# Patient Record
Sex: FEMALE | Race: WHITE | NOT HISPANIC OR LATINO
[De-identification: names, ages, dates, MRNs, and addresses within clinical notes are randomized per-mention and may not be internally consistent; named-entity substitution may affect disease eponyms.]

---

## 2018-01-04 PROBLEM — Z00.00 ENCOUNTER FOR PREVENTIVE HEALTH EXAMINATION: Status: ACTIVE | Noted: 2018-01-04

## 2018-01-10 ENCOUNTER — RECORD ABSTRACTING (OUTPATIENT)
Age: 38
End: 2018-01-10

## 2018-01-10 DIAGNOSIS — Z87.42 PERSONAL HISTORY OF OTHER DISEASES OF THE FEMALE GENITAL TRACT: ICD-10-CM

## 2018-01-10 DIAGNOSIS — Z86.2 PERSONAL HISTORY OF DISEASES OF THE BLOOD AND BLOOD-FORMING ORGANS AND CERTAIN DISORDERS INVOLVING THE IMMUNE MECHANISM: ICD-10-CM

## 2018-01-10 DIAGNOSIS — Z80.8 FAMILY HISTORY OF MALIGNANT NEOPLASM OF OTHER ORGANS OR SYSTEMS: ICD-10-CM

## 2018-01-10 DIAGNOSIS — Z83.3 FAMILY HISTORY OF DIABETES MELLITUS: ICD-10-CM

## 2018-01-10 DIAGNOSIS — O03.9 COMPLETE OR UNSPECIFIED SPONTANEOUS ABORTION W/OUT COMPLICATION: ICD-10-CM

## 2018-02-07 ENCOUNTER — APPOINTMENT (OUTPATIENT)
Dept: OBGYN | Facility: CLINIC | Age: 38
End: 2018-02-07

## 2018-03-06 ENCOUNTER — LABORATORY RESULT (OUTPATIENT)
Age: 38
End: 2018-03-06

## 2018-03-06 ENCOUNTER — APPOINTMENT (OUTPATIENT)
Dept: OBGYN | Facility: CLINIC | Age: 38
End: 2018-03-06
Payer: COMMERCIAL

## 2018-03-06 ENCOUNTER — OUTPATIENT (OUTPATIENT)
Dept: OUTPATIENT SERVICES | Facility: HOSPITAL | Age: 38
LOS: 1 days | Discharge: HOME | End: 2018-03-06

## 2018-03-06 VITALS — HEIGHT: 62 IN | WEIGHT: 149 LBS | BODY MASS INDEX: 27.42 KG/M2

## 2018-03-06 LAB
BILIRUB UR QL STRIP: NORMAL
CLARITY UR: CLEAR
GLUCOSE UR-MCNC: NORMAL
HCG UR QL: NORMAL EU/DL
HGB UR QL STRIP.AUTO: NORMAL
KETONES UR-MCNC: NORMAL
LEUKOCYTE ESTERASE UR QL STRIP: NORMAL
NITRITE UR QL STRIP: NORMAL
PH UR STRIP: 8
PROT UR STRIP-MCNC: NORMAL
SP GR UR STRIP: 1

## 2018-03-06 PROCEDURE — 99213 OFFICE O/P EST LOW 20 MIN: CPT | Mod: 25

## 2018-03-06 PROCEDURE — 81003 URINALYSIS AUTO W/O SCOPE: CPT | Mod: QW

## 2018-03-16 DIAGNOSIS — R89.7 ABNORMAL HISTOLOGICAL FINDINGS IN SPECIMENS FROM OTHER ORGANS, SYSTEMS AND TISSUES: ICD-10-CM

## 2019-04-02 ENCOUNTER — APPOINTMENT (OUTPATIENT)
Dept: OBGYN | Facility: CLINIC | Age: 39
End: 2019-04-02

## 2020-07-13 ENCOUNTER — LABORATORY RESULT (OUTPATIENT)
Age: 40
End: 2020-07-13

## 2020-07-14 ENCOUNTER — LABORATORY RESULT (OUTPATIENT)
Age: 40
End: 2020-07-14

## 2020-07-14 ENCOUNTER — APPOINTMENT (OUTPATIENT)
Dept: OBGYN | Facility: CLINIC | Age: 40
End: 2020-07-14
Payer: COMMERCIAL

## 2020-07-14 VITALS — BODY MASS INDEX: 27.42 KG/M2 | TEMPERATURE: 98.1 F | HEIGHT: 62 IN | WEIGHT: 149 LBS

## 2020-07-14 DIAGNOSIS — Z86.2 PERSONAL HISTORY OF DISEASES OF THE BLOOD AND BLOOD-FORMING ORGANS AND CERTAIN DISORDERS INVOLVING THE IMMUNE MECHANISM: ICD-10-CM

## 2020-07-14 PROCEDURE — 81025 URINE PREGNANCY TEST: CPT

## 2020-07-14 PROCEDURE — 81003 URINALYSIS AUTO W/O SCOPE: CPT | Mod: QW

## 2020-07-14 PROCEDURE — 76830 TRANSVAGINAL US NON-OB: CPT

## 2020-07-14 PROCEDURE — 99395 PREV VISIT EST AGE 18-39: CPT | Mod: 25

## 2020-07-23 ENCOUNTER — LABORATORY RESULT (OUTPATIENT)
Age: 40
End: 2020-07-23

## 2020-07-23 ENCOUNTER — APPOINTMENT (OUTPATIENT)
Dept: OBGYN | Facility: CLINIC | Age: 40
End: 2020-07-23
Payer: COMMERCIAL

## 2020-07-23 VITALS — BODY MASS INDEX: 27.8 KG/M2 | TEMPERATURE: 97.7 F | WEIGHT: 152 LBS

## 2020-07-23 LAB
BILIRUB UR QL STRIP: NORMAL
BILIRUB UR QL STRIP: NORMAL
CLARITY UR: CLEAR
GLUCOSE UR-MCNC: NORMAL
GLUCOSE UR-MCNC: NORMAL
HCG UR QL: NORMAL EU/DL
HCG UR QL: NORMAL EU/DL
HGB UR QL STRIP.AUTO: NORMAL
HGB UR QL STRIP.AUTO: NORMAL
KETONES UR-MCNC: NORMAL
KETONES UR-MCNC: NORMAL
LEUKOCYTE ESTERASE UR QL STRIP: NORMAL
LEUKOCYTE ESTERASE UR QL STRIP: NORMAL
NITRITE UR QL STRIP: NORMAL
NITRITE UR QL STRIP: NORMAL
PH UR STRIP: 7
PH UR STRIP: 7
PROT UR STRIP-MCNC: NORMAL
PROT UR STRIP-MCNC: NORMAL
SP GR UR STRIP: 1.01
SP GR UR STRIP: 1.01

## 2020-07-23 PROCEDURE — 99213 OFFICE O/P EST LOW 20 MIN: CPT | Mod: 25

## 2020-07-23 PROCEDURE — 76830 TRANSVAGINAL US NON-OB: CPT

## 2020-08-11 ENCOUNTER — APPOINTMENT (OUTPATIENT)
Dept: OBGYN | Facility: CLINIC | Age: 40
End: 2020-08-11
Payer: COMMERCIAL

## 2020-08-11 ENCOUNTER — NON-APPOINTMENT (OUTPATIENT)
Age: 40
End: 2020-08-11

## 2020-08-11 VITALS — TEMPERATURE: 98.8 F | WEIGHT: 156 LBS | HEIGHT: 62 IN | BODY MASS INDEX: 28.71 KG/M2

## 2020-08-11 PROCEDURE — 0502F SUBSEQUENT PRENATAL CARE: CPT

## 2020-09-01 ENCOUNTER — NON-APPOINTMENT (OUTPATIENT)
Age: 40
End: 2020-09-01

## 2020-09-01 ENCOUNTER — APPOINTMENT (OUTPATIENT)
Dept: OBGYN | Facility: CLINIC | Age: 40
End: 2020-09-01
Payer: COMMERCIAL

## 2020-09-01 VITALS — WEIGHT: 161 LBS | HEIGHT: 62 IN | BODY MASS INDEX: 29.63 KG/M2 | TEMPERATURE: 98.2 F

## 2020-09-01 LAB
BILIRUB UR QL STRIP: NORMAL
BILIRUB UR QL STRIP: NORMAL
CLARITY UR: CLEAR
CLARITY UR: CLEAR
GLUCOSE UR-MCNC: NORMAL
GLUCOSE UR-MCNC: NORMAL
HCG UR QL: NORMAL EU/DL
HCG UR QL: NORMAL EU/DL
HGB UR QL STRIP.AUTO: NORMAL
HGB UR QL STRIP.AUTO: NORMAL
KETONES UR-MCNC: NORMAL
KETONES UR-MCNC: NORMAL
LEUKOCYTE ESTERASE UR QL STRIP: NORMAL
LEUKOCYTE ESTERASE UR QL STRIP: NORMAL
NITRITE UR QL STRIP: NORMAL
NITRITE UR QL STRIP: NORMAL
PH UR STRIP: 5
PH UR STRIP: 7
PROT UR STRIP-MCNC: NORMAL
PROT UR STRIP-MCNC: NORMAL
SP GR UR STRIP: 1.01
SP GR UR STRIP: 1.01

## 2020-09-01 PROCEDURE — 0502F SUBSEQUENT PRENATAL CARE: CPT

## 2020-09-30 ENCOUNTER — NON-APPOINTMENT (OUTPATIENT)
Age: 40
End: 2020-09-30

## 2020-09-30 ENCOUNTER — APPOINTMENT (OUTPATIENT)
Dept: OBGYN | Facility: CLINIC | Age: 40
End: 2020-09-30
Payer: COMMERCIAL

## 2020-09-30 VITALS
WEIGHT: 165 LBS | BODY MASS INDEX: 30.18 KG/M2 | SYSTOLIC BLOOD PRESSURE: 110 MMHG | TEMPERATURE: 97.1 F | DIASTOLIC BLOOD PRESSURE: 70 MMHG

## 2020-09-30 LAB
BILIRUB UR QL STRIP: NORMAL
GLUCOSE UR-MCNC: NORMAL
HCG UR QL: 0.2 EU/DL
HGB UR QL STRIP.AUTO: NORMAL
KETONES UR-MCNC: NORMAL
LEUKOCYTE ESTERASE UR QL STRIP: NORMAL
NITRITE UR QL STRIP: NORMAL
PH UR STRIP: 5.5
PROT UR STRIP-MCNC: NORMAL
SP GR UR STRIP: 1.01

## 2020-09-30 PROCEDURE — 90471 IMMUNIZATION ADMIN: CPT

## 2020-09-30 PROCEDURE — 0502F SUBSEQUENT PRENATAL CARE: CPT

## 2020-09-30 PROCEDURE — 90682 RIV4 VACC RECOMBINANT DNA IM: CPT

## 2020-10-08 ENCOUNTER — NON-APPOINTMENT (OUTPATIENT)
Age: 40
End: 2020-10-08

## 2020-10-22 ENCOUNTER — APPOINTMENT (OUTPATIENT)
Dept: OBGYN | Facility: CLINIC | Age: 40
End: 2020-10-22
Payer: COMMERCIAL

## 2020-10-22 ENCOUNTER — NON-APPOINTMENT (OUTPATIENT)
Age: 40
End: 2020-10-22

## 2020-10-22 VITALS — BODY MASS INDEX: 30.91 KG/M2 | WEIGHT: 168 LBS | TEMPERATURE: 97.9 F | HEIGHT: 62 IN

## 2020-10-22 LAB
BILIRUB UR QL STRIP: NORMAL
CLARITY UR: CLEAR
GLUCOSE UR-MCNC: NORMAL
HCG UR QL: 0.2 EU/DL
HGB UR QL STRIP.AUTO: NORMAL
KETONES UR-MCNC: NORMAL
LEUKOCYTE ESTERASE UR QL STRIP: NORMAL
NITRITE UR QL STRIP: NORMAL
PH UR STRIP: 5.5
PROT UR STRIP-MCNC: NORMAL
SP GR UR STRIP: 1.03

## 2020-10-22 PROCEDURE — 0502F SUBSEQUENT PRENATAL CARE: CPT

## 2020-10-22 PROCEDURE — 99072 ADDL SUPL MATRL&STAF TM PHE: CPT

## 2020-10-22 PROCEDURE — 76805 OB US >/= 14 WKS SNGL FETUS: CPT

## 2020-11-09 ENCOUNTER — NON-APPOINTMENT (OUTPATIENT)
Age: 40
End: 2020-11-09

## 2020-11-09 ENCOUNTER — APPOINTMENT (OUTPATIENT)
Dept: OBGYN | Facility: CLINIC | Age: 40
End: 2020-11-09
Payer: COMMERCIAL

## 2020-11-09 VITALS
WEIGHT: 171 LBS | DIASTOLIC BLOOD PRESSURE: 70 MMHG | BODY MASS INDEX: 31.28 KG/M2 | SYSTOLIC BLOOD PRESSURE: 110 MMHG | TEMPERATURE: 97.3 F

## 2020-11-09 LAB
BILIRUB UR QL STRIP: NORMAL
GLUCOSE UR-MCNC: NORMAL
HCG UR QL: 0.2 EU/DL
HGB UR QL STRIP.AUTO: NORMAL
KETONES UR-MCNC: NORMAL
LEUKOCYTE ESTERASE UR QL STRIP: NORMAL
NITRITE UR QL STRIP: NORMAL
PH UR STRIP: 6
PROT UR STRIP-MCNC: NORMAL
SP GR UR STRIP: 1.01

## 2020-11-09 PROCEDURE — 0502F SUBSEQUENT PRENATAL CARE: CPT

## 2020-12-03 ENCOUNTER — APPOINTMENT (OUTPATIENT)
Dept: OBGYN | Facility: CLINIC | Age: 40
End: 2020-12-03
Payer: COMMERCIAL

## 2020-12-03 ENCOUNTER — NON-APPOINTMENT (OUTPATIENT)
Age: 40
End: 2020-12-03

## 2020-12-03 VITALS — TEMPERATURE: 97.9 F | WEIGHT: 171 LBS | BODY MASS INDEX: 31.47 KG/M2 | HEIGHT: 62 IN

## 2020-12-03 LAB
BILIRUB UR QL STRIP: NORMAL
CLARITY UR: CLEAR
GLUCOSE UR-MCNC: NORMAL
HCG UR QL: 0.2 EU/DL
HGB UR QL STRIP.AUTO: NORMAL
KETONES UR-MCNC: NORMAL
LEUKOCYTE ESTERASE UR QL STRIP: NORMAL
NITRITE UR QL STRIP: NORMAL
PH UR STRIP: 6
PROT UR STRIP-MCNC: NORMAL
SP GR UR STRIP: 1

## 2020-12-03 PROCEDURE — 0502F SUBSEQUENT PRENATAL CARE: CPT

## 2020-12-08 ENCOUNTER — APPOINTMENT (OUTPATIENT)
Dept: MATERNAL FETAL MEDICINE | Facility: CLINIC | Age: 40
End: 2020-12-08
Payer: COMMERCIAL

## 2020-12-08 ENCOUNTER — NON-APPOINTMENT (OUTPATIENT)
Age: 40
End: 2020-12-08

## 2020-12-08 PROCEDURE — 99215 OFFICE O/P EST HI 40 MIN: CPT

## 2020-12-08 PROCEDURE — 76819 FETAL BIOPHYS PROFIL W/O NST: CPT

## 2020-12-08 PROCEDURE — 76816 OB US FOLLOW-UP PER FETUS: CPT

## 2020-12-08 PROCEDURE — 76820 UMBILICAL ARTERY ECHO: CPT

## 2020-12-08 PROCEDURE — 99072 ADDL SUPL MATRL&STAF TM PHE: CPT

## 2020-12-21 ENCOUNTER — APPOINTMENT (OUTPATIENT)
Dept: OBGYN | Facility: CLINIC | Age: 40
End: 2020-12-21
Payer: COMMERCIAL

## 2020-12-21 ENCOUNTER — NON-APPOINTMENT (OUTPATIENT)
Age: 40
End: 2020-12-21

## 2020-12-21 VITALS
BODY MASS INDEX: 31.83 KG/M2 | TEMPERATURE: 97.5 F | SYSTOLIC BLOOD PRESSURE: 110 MMHG | DIASTOLIC BLOOD PRESSURE: 70 MMHG | WEIGHT: 174 LBS

## 2020-12-21 LAB
BILIRUB UR QL STRIP: NORMAL
GLUCOSE UR-MCNC: NORMAL
HCG UR QL: 6.5 EU/DL
HGB UR QL STRIP.AUTO: NORMAL
KETONES UR-MCNC: NORMAL
LEUKOCYTE ESTERASE UR QL STRIP: NORMAL
NITRITE UR QL STRIP: NORMAL
PH UR STRIP: 6.5
PROT UR STRIP-MCNC: NORMAL
SP GR UR STRIP: 1.01

## 2020-12-21 PROCEDURE — 0502F SUBSEQUENT PRENATAL CARE: CPT

## 2020-12-26 ENCOUNTER — NON-APPOINTMENT (OUTPATIENT)
Age: 40
End: 2020-12-26

## 2020-12-29 ENCOUNTER — APPOINTMENT (OUTPATIENT)
Dept: MATERNAL FETAL MEDICINE | Facility: CLINIC | Age: 40
End: 2020-12-29
Payer: COMMERCIAL

## 2020-12-29 PROCEDURE — 99213 OFFICE O/P EST LOW 20 MIN: CPT

## 2020-12-29 PROCEDURE — 76816 OB US FOLLOW-UP PER FETUS: CPT

## 2020-12-29 PROCEDURE — 76819 FETAL BIOPHYS PROFIL W/O NST: CPT

## 2020-12-29 PROCEDURE — 99072 ADDL SUPL MATRL&STAF TM PHE: CPT

## 2021-01-05 ENCOUNTER — APPOINTMENT (OUTPATIENT)
Dept: MATERNAL FETAL MEDICINE | Facility: CLINIC | Age: 41
End: 2021-01-05
Payer: COMMERCIAL

## 2021-01-05 PROCEDURE — 99072 ADDL SUPL MATRL&STAF TM PHE: CPT

## 2021-01-05 PROCEDURE — 99213 OFFICE O/P EST LOW 20 MIN: CPT

## 2021-01-05 PROCEDURE — 76819 FETAL BIOPHYS PROFIL W/O NST: CPT

## 2021-01-05 PROCEDURE — 76815 OB US LIMITED FETUS(S): CPT

## 2021-01-06 ENCOUNTER — NON-APPOINTMENT (OUTPATIENT)
Age: 41
End: 2021-01-06

## 2021-01-06 ENCOUNTER — APPOINTMENT (OUTPATIENT)
Dept: OBGYN | Facility: CLINIC | Age: 41
End: 2021-01-06
Payer: COMMERCIAL

## 2021-01-06 VITALS — HEIGHT: 62 IN | TEMPERATURE: 98 F | WEIGHT: 172 LBS | BODY MASS INDEX: 31.65 KG/M2

## 2021-01-06 LAB
BILIRUB UR QL STRIP: NORMAL
CLARITY UR: CLEAR
GLUCOSE UR-MCNC: NORMAL
HCG UR QL: NORMAL EU/DL
HGB UR QL STRIP.AUTO: NORMAL
KETONES UR-MCNC: NORMAL
LEUKOCYTE ESTERASE UR QL STRIP: NORMAL
NITRITE UR QL STRIP: NORMAL
PH UR STRIP: 5.5
PROT UR STRIP-MCNC: NORMAL
SP GR UR STRIP: 1.01

## 2021-01-06 PROCEDURE — 0502F SUBSEQUENT PRENATAL CARE: CPT

## 2021-01-19 ENCOUNTER — APPOINTMENT (OUTPATIENT)
Dept: MATERNAL FETAL MEDICINE | Facility: CLINIC | Age: 41
End: 2021-01-19
Payer: COMMERCIAL

## 2021-01-19 PROCEDURE — 99072 ADDL SUPL MATRL&STAF TM PHE: CPT

## 2021-01-19 PROCEDURE — 76819 FETAL BIOPHYS PROFIL W/O NST: CPT

## 2021-01-19 PROCEDURE — 76816 OB US FOLLOW-UP PER FETUS: CPT

## 2021-01-20 ENCOUNTER — NON-APPOINTMENT (OUTPATIENT)
Age: 41
End: 2021-01-20

## 2021-01-20 ENCOUNTER — APPOINTMENT (OUTPATIENT)
Dept: OBGYN | Facility: CLINIC | Age: 41
End: 2021-01-20
Payer: COMMERCIAL

## 2021-01-20 VITALS
TEMPERATURE: 97.9 F | DIASTOLIC BLOOD PRESSURE: 70 MMHG | BODY MASS INDEX: 31.09 KG/M2 | SYSTOLIC BLOOD PRESSURE: 110 MMHG | WEIGHT: 170 LBS

## 2021-01-20 LAB
BILIRUB UR QL STRIP: NORMAL
GLUCOSE UR-MCNC: NORMAL
HCG UR QL: 0.2 EU/DL
HGB UR QL STRIP.AUTO: NORMAL
KETONES UR-MCNC: NORMAL
LEUKOCYTE ESTERASE UR QL STRIP: NORMAL
NITRITE UR QL STRIP: NORMAL
PH UR STRIP: 7
PROT UR STRIP-MCNC: NORMAL
SP GR UR STRIP: 1.01

## 2021-01-20 PROCEDURE — 0502F SUBSEQUENT PRENATAL CARE: CPT

## 2021-01-26 ENCOUNTER — NON-APPOINTMENT (OUTPATIENT)
Age: 41
End: 2021-01-26

## 2021-02-02 ENCOUNTER — APPOINTMENT (OUTPATIENT)
Dept: MATERNAL FETAL MEDICINE | Facility: CLINIC | Age: 41
End: 2021-02-02

## 2021-02-03 ENCOUNTER — NON-APPOINTMENT (OUTPATIENT)
Age: 41
End: 2021-02-03

## 2021-02-03 ENCOUNTER — APPOINTMENT (OUTPATIENT)
Dept: OBGYN | Facility: CLINIC | Age: 41
End: 2021-02-03
Payer: COMMERCIAL

## 2021-02-03 ENCOUNTER — APPOINTMENT (OUTPATIENT)
Dept: OBGYN | Facility: CLINIC | Age: 41
End: 2021-02-03

## 2021-02-03 VITALS
SYSTOLIC BLOOD PRESSURE: 110 MMHG | BODY MASS INDEX: 31.09 KG/M2 | WEIGHT: 170 LBS | DIASTOLIC BLOOD PRESSURE: 70 MMHG | TEMPERATURE: 97.8 F

## 2021-02-03 PROCEDURE — 0502F SUBSEQUENT PRENATAL CARE: CPT

## 2021-02-04 LAB
BILIRUB UR QL STRIP: NORMAL
GLUCOSE UR-MCNC: NORMAL
HCG UR QL: 0.2 EU/DL
HGB UR QL STRIP.AUTO: NORMAL
KETONES UR-MCNC: NORMAL
LEUKOCYTE ESTERASE UR QL STRIP: NORMAL
NITRITE UR QL STRIP: NORMAL
PH UR STRIP: 6.5
PROT UR STRIP-MCNC: NORMAL
SP GR UR STRIP: 1.01

## 2021-02-18 ENCOUNTER — APPOINTMENT (OUTPATIENT)
Dept: MATERNAL FETAL MEDICINE | Facility: CLINIC | Age: 41
End: 2021-02-18

## 2021-02-18 ENCOUNTER — APPOINTMENT (OUTPATIENT)
Dept: OBGYN | Facility: CLINIC | Age: 41
End: 2021-02-18

## 2021-02-22 ENCOUNTER — NON-APPOINTMENT (OUTPATIENT)
Age: 41
End: 2021-02-22

## 2021-02-22 ENCOUNTER — LABORATORY RESULT (OUTPATIENT)
Age: 41
End: 2021-02-22

## 2021-02-23 ENCOUNTER — APPOINTMENT (OUTPATIENT)
Dept: OBGYN | Facility: CLINIC | Age: 41
End: 2021-02-23
Payer: COMMERCIAL

## 2021-02-23 ENCOUNTER — NON-APPOINTMENT (OUTPATIENT)
Age: 41
End: 2021-02-23

## 2021-02-23 VITALS — TEMPERATURE: 97.8 F | BODY MASS INDEX: 31.28 KG/M2 | WEIGHT: 171 LBS

## 2021-02-23 PROCEDURE — 0502F SUBSEQUENT PRENATAL CARE: CPT

## 2021-02-25 ENCOUNTER — APPOINTMENT (OUTPATIENT)
Dept: MATERNAL FETAL MEDICINE | Facility: CLINIC | Age: 41
End: 2021-02-25
Payer: COMMERCIAL

## 2021-02-25 PROCEDURE — 99213 OFFICE O/P EST LOW 20 MIN: CPT

## 2021-02-25 PROCEDURE — 76816 OB US FOLLOW-UP PER FETUS: CPT

## 2021-02-25 PROCEDURE — 99072 ADDL SUPL MATRL&STAF TM PHE: CPT

## 2021-02-25 PROCEDURE — 76819 FETAL BIOPHYS PROFIL W/O NST: CPT

## 2021-03-01 ENCOUNTER — LABORATORY RESULT (OUTPATIENT)
Age: 41
End: 2021-03-01

## 2021-03-01 ENCOUNTER — OUTPATIENT (OUTPATIENT)
Dept: OUTPATIENT SERVICES | Facility: HOSPITAL | Age: 41
LOS: 1 days | Discharge: HOME | End: 2021-03-01

## 2021-03-01 DIAGNOSIS — Z11.59 ENCOUNTER FOR SCREENING FOR OTHER VIRAL DISEASES: ICD-10-CM

## 2021-03-03 ENCOUNTER — INPATIENT (INPATIENT)
Facility: HOSPITAL | Age: 41
LOS: 1 days | Discharge: HOME | End: 2021-03-05
Attending: OBSTETRICS & GYNECOLOGY | Admitting: OBSTETRICS & GYNECOLOGY
Payer: COMMERCIAL

## 2021-03-03 ENCOUNTER — RESULT REVIEW (OUTPATIENT)
Age: 41
End: 2021-03-03

## 2021-03-03 ENCOUNTER — APPOINTMENT (OUTPATIENT)
Dept: OBGYN | Facility: CLINIC | Age: 41
End: 2021-03-03
Payer: COMMERCIAL

## 2021-03-03 VITALS — HEART RATE: 93 BPM | SYSTOLIC BLOOD PRESSURE: 116 MMHG | DIASTOLIC BLOOD PRESSURE: 71 MMHG

## 2021-03-03 DIAGNOSIS — Z3A.38 38 WEEKS GESTATION OF PREGNANCY: ICD-10-CM

## 2021-03-03 DIAGNOSIS — O34.211 MATERNAL CARE FOR LOW TRANSVERSE SCAR FROM PREVIOUS CESAREAN DELIVERY: ICD-10-CM

## 2021-03-03 DIAGNOSIS — Z98.891 HISTORY OF UTERINE SCAR FROM PREVIOUS SURGERY: Chronic | ICD-10-CM

## 2021-03-03 DIAGNOSIS — O24.419 GESTATIONAL DIABETES MELLITUS IN PREGNANCY, UNSPECIFIED CONTROL: ICD-10-CM

## 2021-03-03 DIAGNOSIS — D50.9 IRON DEFICIENCY ANEMIA, UNSPECIFIED: ICD-10-CM

## 2021-03-03 LAB
AMPHET UR-MCNC: NEGATIVE — SIGNIFICANT CHANGE UP
APPEARANCE UR: CLEAR — SIGNIFICANT CHANGE UP
BARBITURATES UR SCN-MCNC: NEGATIVE — SIGNIFICANT CHANGE UP
BASOPHILS # BLD AUTO: 0.01 K/UL — SIGNIFICANT CHANGE UP (ref 0–0.2)
BASOPHILS NFR BLD AUTO: 0.1 % — SIGNIFICANT CHANGE UP (ref 0–1)
BENZODIAZ UR-MCNC: NEGATIVE — SIGNIFICANT CHANGE UP
BILIRUB UR-MCNC: NEGATIVE — SIGNIFICANT CHANGE UP
BLD GP AB SCN SERPL QL: SIGNIFICANT CHANGE UP
BUPRENORPHINE SCREEN, URINE RESULT: NEGATIVE — SIGNIFICANT CHANGE UP
COCAINE METAB.OTHER UR-MCNC: NEGATIVE — SIGNIFICANT CHANGE UP
COLOR SPEC: SIGNIFICANT CHANGE UP
DIFF PNL FLD: NEGATIVE — SIGNIFICANT CHANGE UP
EOSINOPHIL # BLD AUTO: 0.06 K/UL — SIGNIFICANT CHANGE UP (ref 0–0.7)
EOSINOPHIL NFR BLD AUTO: 0.7 % — SIGNIFICANT CHANGE UP (ref 0–8)
FENTANYL UR QL: NEGATIVE — SIGNIFICANT CHANGE UP
GLUCOSE BLDC GLUCOMTR-MCNC: 76 MG/DL — SIGNIFICANT CHANGE UP (ref 70–99)
GLUCOSE UR QL: NEGATIVE — SIGNIFICANT CHANGE UP
HCT VFR BLD CALC: 31 % — LOW (ref 37–47)
HGB BLD-MCNC: 9.8 G/DL — LOW (ref 12–16)
IMM GRANULOCYTES NFR BLD AUTO: 0.4 % — HIGH (ref 0.1–0.3)
KETONES UR-MCNC: NEGATIVE — SIGNIFICANT CHANGE UP
L&D DRUG SCREEN, URINE: SIGNIFICANT CHANGE UP
LEUKOCYTE ESTERASE UR-ACNC: NEGATIVE — SIGNIFICANT CHANGE UP
LYMPHOCYTES # BLD AUTO: 1.59 K/UL — SIGNIFICANT CHANGE UP (ref 1.2–3.4)
LYMPHOCYTES # BLD AUTO: 17.8 % — LOW (ref 20.5–51.1)
MCHC RBC-ENTMCNC: 19.8 PG — LOW (ref 27–31)
MCHC RBC-ENTMCNC: 31.6 G/DL — LOW (ref 32–37)
MCV RBC AUTO: 62.8 FL — LOW (ref 81–99)
METHADONE UR-MCNC: NEGATIVE — SIGNIFICANT CHANGE UP
MONOCYTES # BLD AUTO: 0.4 K/UL — SIGNIFICANT CHANGE UP (ref 0.1–0.6)
MONOCYTES NFR BLD AUTO: 4.5 % — SIGNIFICANT CHANGE UP (ref 1.7–9.3)
NEUTROPHILS # BLD AUTO: 6.82 K/UL — HIGH (ref 1.4–6.5)
NEUTROPHILS NFR BLD AUTO: 76.5 % — HIGH (ref 42.2–75.2)
NITRITE UR-MCNC: NEGATIVE — SIGNIFICANT CHANGE UP
NRBC # BLD: 0 /100 WBCS — SIGNIFICANT CHANGE UP (ref 0–0)
OPIATES UR-MCNC: NEGATIVE — SIGNIFICANT CHANGE UP
OXYCODONE UR-MCNC: NEGATIVE — SIGNIFICANT CHANGE UP
PCP UR-MCNC: NEGATIVE — SIGNIFICANT CHANGE UP
PH UR: 6.5 — SIGNIFICANT CHANGE UP (ref 5–8)
PLATELET # BLD AUTO: 221 K/UL — SIGNIFICANT CHANGE UP (ref 130–400)
PRENATAL SYPHILIS TEST: SIGNIFICANT CHANGE UP
PROPOXYPHENE QUALITATIVE URINE RESULT: NEGATIVE — SIGNIFICANT CHANGE UP
PROT UR-MCNC: NEGATIVE — SIGNIFICANT CHANGE UP
RBC # BLD: 4.94 M/UL — SIGNIFICANT CHANGE UP (ref 4.2–5.4)
RBC # FLD: 18.1 % — HIGH (ref 11.5–14.5)
SP GR SPEC: 1.01 — SIGNIFICANT CHANGE UP (ref 1.01–1.03)
UROBILINOGEN FLD QL: SIGNIFICANT CHANGE UP
WBC # BLD: 8.92 K/UL — SIGNIFICANT CHANGE UP (ref 4.8–10.8)
WBC # FLD AUTO: 8.92 K/UL — SIGNIFICANT CHANGE UP (ref 4.8–10.8)

## 2021-03-03 PROCEDURE — 99072 ADDL SUPL MATRL&STAF TM PHE: CPT

## 2021-03-03 PROCEDURE — 88304 TISSUE EXAM BY PATHOLOGIST: CPT | Mod: 26

## 2021-03-03 PROCEDURE — 59510 CESAREAN DELIVERY: CPT

## 2021-03-03 PROCEDURE — 58611 LIGATE OVIDUCT(S) ADD-ON: CPT

## 2021-03-03 PROCEDURE — 90471 IMMUNIZATION ADMIN: CPT

## 2021-03-03 PROCEDURE — 88302 TISSUE EXAM BY PATHOLOGIST: CPT | Mod: 26

## 2021-03-03 PROCEDURE — 90715 TDAP VACCINE 7 YRS/> IM: CPT

## 2021-03-03 RX ORDER — OXYTOCIN 10 UNIT/ML
41.67 VIAL (ML) INJECTION
Qty: 20 | Refills: 0 | Status: DISCONTINUED | OUTPATIENT
Start: 2021-03-03 | End: 2021-03-05

## 2021-03-03 RX ORDER — METOCLOPRAMIDE HCL 10 MG
10 TABLET ORAL ONCE
Refills: 0 | Status: DISCONTINUED | OUTPATIENT
Start: 2021-03-03 | End: 2021-03-03

## 2021-03-03 RX ORDER — ENOXAPARIN SODIUM 100 MG/ML
40 INJECTION SUBCUTANEOUS DAILY
Refills: 0 | Status: DISCONTINUED | OUTPATIENT
Start: 2021-03-04 | End: 2021-03-05

## 2021-03-03 RX ORDER — SIMETHICONE 80 MG/1
80 TABLET, CHEWABLE ORAL EVERY 4 HOURS
Refills: 0 | Status: DISCONTINUED | OUTPATIENT
Start: 2021-03-03 | End: 2021-03-05

## 2021-03-03 RX ORDER — KETOROLAC TROMETHAMINE 30 MG/ML
30 SYRINGE (ML) INJECTION EVERY 6 HOURS
Refills: 0 | Status: DISCONTINUED | OUTPATIENT
Start: 2021-03-03 | End: 2021-03-03

## 2021-03-03 RX ORDER — SODIUM CHLORIDE 9 MG/ML
1000 INJECTION, SOLUTION INTRAVENOUS ONCE
Refills: 0 | Status: DISCONTINUED | OUTPATIENT
Start: 2021-03-03 | End: 2021-03-03

## 2021-03-03 RX ORDER — OXYTOCIN 10 UNIT/ML
333.33 VIAL (ML) INJECTION
Qty: 20 | Refills: 0 | Status: DISCONTINUED | OUTPATIENT
Start: 2021-03-03 | End: 2021-03-05

## 2021-03-03 RX ORDER — DIPHENHYDRAMINE HCL 50 MG
25 CAPSULE ORAL EVERY 6 HOURS
Refills: 0 | Status: DISCONTINUED | OUTPATIENT
Start: 2021-03-03 | End: 2021-03-05

## 2021-03-03 RX ORDER — OXYCODONE HYDROCHLORIDE 5 MG/1
5 TABLET ORAL
Refills: 0 | Status: DISCONTINUED | OUTPATIENT
Start: 2021-03-03 | End: 2021-03-05

## 2021-03-03 RX ORDER — IBUPROFEN 200 MG
600 TABLET ORAL EVERY 6 HOURS
Refills: 0 | Status: DISCONTINUED | OUTPATIENT
Start: 2021-03-03 | End: 2021-03-05

## 2021-03-03 RX ORDER — MAGNESIUM HYDROXIDE 400 MG/1
30 TABLET, CHEWABLE ORAL
Refills: 0 | Status: DISCONTINUED | OUTPATIENT
Start: 2021-03-03 | End: 2021-03-05

## 2021-03-03 RX ORDER — ACETAMINOPHEN 500 MG
975 TABLET ORAL
Refills: 0 | Status: DISCONTINUED | OUTPATIENT
Start: 2021-03-03 | End: 2021-03-05

## 2021-03-03 RX ORDER — SODIUM CHLORIDE 9 MG/ML
1000 INJECTION, SOLUTION INTRAVENOUS
Refills: 0 | Status: DISCONTINUED | OUTPATIENT
Start: 2021-03-03 | End: 2021-03-03

## 2021-03-03 RX ORDER — LANOLIN
1 OINTMENT (GRAM) TOPICAL EVERY 6 HOURS
Refills: 0 | Status: DISCONTINUED | OUTPATIENT
Start: 2021-03-03 | End: 2021-03-05

## 2021-03-03 RX ORDER — FAMOTIDINE 10 MG/ML
20 INJECTION INTRAVENOUS ONCE
Refills: 0 | Status: COMPLETED | OUTPATIENT
Start: 2021-03-03 | End: 2021-03-03

## 2021-03-03 RX ORDER — SENNA PLUS 8.6 MG/1
2 TABLET ORAL AT BEDTIME
Refills: 0 | Status: DISCONTINUED | OUTPATIENT
Start: 2021-03-03 | End: 2021-03-05

## 2021-03-03 RX ORDER — IBUPROFEN 200 MG
600 TABLET ORAL EVERY 6 HOURS
Refills: 0 | Status: COMPLETED | OUTPATIENT
Start: 2021-03-03 | End: 2022-01-30

## 2021-03-03 RX ORDER — OXYCODONE HYDROCHLORIDE 5 MG/1
5 TABLET ORAL ONCE
Refills: 0 | Status: DISCONTINUED | OUTPATIENT
Start: 2021-03-03 | End: 2021-03-05

## 2021-03-03 RX ORDER — CEFAZOLIN SODIUM 1 G
2000 VIAL (EA) INJECTION ONCE
Refills: 0 | Status: COMPLETED | OUTPATIENT
Start: 2021-03-03 | End: 2021-03-03

## 2021-03-03 RX ORDER — SODIUM CHLORIDE 9 MG/ML
1000 INJECTION, SOLUTION INTRAVENOUS
Refills: 0 | Status: DISCONTINUED | OUTPATIENT
Start: 2021-03-03 | End: 2021-03-05

## 2021-03-03 RX ORDER — CITRIC ACID/SODIUM CITRATE 300-500 MG
30 SOLUTION, ORAL ORAL ONCE
Refills: 0 | Status: COMPLETED | OUTPATIENT
Start: 2021-03-03 | End: 2021-03-03

## 2021-03-03 RX ADMIN — Medication 600 MILLIGRAM(S): at 17:21

## 2021-03-03 RX ADMIN — Medication 975 MILLIGRAM(S): at 15:03

## 2021-03-03 RX ADMIN — SENNA PLUS 2 TABLET(S): 8.6 TABLET ORAL at 21:43

## 2021-03-03 RX ADMIN — Medication 100 MILLIGRAM(S): at 09:59

## 2021-03-03 RX ADMIN — FAMOTIDINE 20 MILLIGRAM(S): 10 INJECTION INTRAVENOUS at 10:01

## 2021-03-03 RX ADMIN — Medication 30 MILLILITER(S): at 10:00

## 2021-03-03 RX ADMIN — Medication 600 MILLIGRAM(S): at 17:51

## 2021-03-03 RX ADMIN — Medication 600 MILLIGRAM(S): at 23:02

## 2021-03-03 RX ADMIN — Medication 975 MILLIGRAM(S): at 15:30

## 2021-03-03 RX ADMIN — Medication 600 MILLIGRAM(S): at 23:03

## 2021-03-03 NOTE — OB PROVIDER H&P - HISTORY OF PRESENT ILLNESS
40y  @ 38.5 weeks by LMP, EDC 3/12/2021, AMA, h/o GDM and anemia, present for scheduled repeat C/S and bilateral tubal ligation. Patient has h/o prior C/S x 1. GDM is controlled with insulin at bedtime. Fasting ranges from . 2 hrs post prandial ranges from . FS this morning was 76. Last dose of insulin was taken last night. S/p iron transfusion x 7, last transfusion was 2021. Denies VB, LOF, and ctx. +FM. No complaints at this time.   40y  @ 38.5 weeks by LMP, EDC 3/12/2021, AMA, h/o GDM and anemia, present for scheduled repeat C/S and bilateral tubal ligation. Patient has h/o prior C/S x 1. GDM is controlled with insulin at bedtime. Fasting ranges from . 2 hrs post prandial ranges from . FS this morning was 76. Last dose of insulin was taken last night. S/p iron infusion x 7, last insfusion was 2021. Denies VB, LOF, and ctx. +FM. No complaints at this time.   40y  @ 38.5 weeks by LMP, EDC 3/12/2021, AMA, h/o GDM and anemia, present for scheduled repeat C/S and bilateral salpingectomy. Patient has h/o prior C/S x 1. GDM is controlled with insulin at bedtime. Fasting ranges from . 2 hrs post prandial ranges from . FS this morning was 76. Last dose of insulin was taken last night. S/p iron infusion x 7, last insfusion was 2021. Denies VB, LOF, and ctx. +FM. No complaints at this time.

## 2021-03-03 NOTE — CHART NOTE - NSCHARTNOTEFT_GEN_A_CORE
PACU ANESTHESIA ADMISSION NOTE      Procedure:  section with bilateral salpingectomy    Repeat  section      Post op diagnosis:  GDM (gestational diabetes mellitus)    Previous  section    Multiparity        ____  Intubated  TV:______       Rate: ______      FiO2: ______    __x__  Patent Airway    ____  Full return of protective reflexes    ____  Full recovery from anesthesia / back to baseline     Vitals:   T:  98         R:  18                BP: 108/65                 Sat: 98                  P: 86      Mental Status:  ___x_ Awake   _____ Alert   _____ Drowsy   _____ Sedated    Nausea/Vomiting:  _x___ NO  ______Yes,   See Post - Op Orders          Pain Scale (0-10):  ___0__    Treatment: ____ None    ____ See Post - Op/PCA Orders    Post - Operative Fluids:   ___x Oral   ____ See Post - Op Orders    Plan: Discharge:   ____Home       ___x__Floor     _____Critical Care    _____  Other:_________________    Comments:

## 2021-03-03 NOTE — OB PROVIDER H&P - ASSESSMENT
40y  @ 38.5 weeks, AMA, GDMA2 and anemia, h/o prior c/s x 2, scheduled rpt c/s x 3 and BTL.    Plan:  Admit to L&D  IVF  NPO diet  Continuous TOCO/EFM  Fingerstick on admission  Crossed for 2 PRBC units  Ancef prior to OR  Abdominal prep  SCDs B/L  Pain Management PRN    Dr. Lopez aware 40y  @ 38.5 weeks, AMA, GDMA2 and anemia, h/o prior c/s x 2, scheduled rpt c/s x 3 and bilateral salpingectomy.    Plan:  Admit to L&D  IVF  NPO diet  Continuous TOCO/EFM  Fingerstick on admission  Crossed for 2 PRBC units  Ancef prior to OR  Abdominal prep  SCDs B/L  Pain Management PRN    Dr. Lopez aware

## 2021-03-03 NOTE — OB PROVIDER H&P - NS_OBGYNHISTORY_OBGYN_ALL_OB_FT
OB Hx: C/S x 2. Largest 7-2               SAB x 1. D&C x 0    G1 10/30/2010 FT C/S for arrest at 8 cm and NRFHR M 7-2 UH No complications +Epi  G2 4/25/2014 FT Rpt c/s F 6-12 [facility] GDMA1    Gyn Hx: h/o abnormal paps  Denies cysts, fibroids, and STIs.

## 2021-03-03 NOTE — OB PROVIDER H&P - NSHPPHYSICALEXAM_GEN_ALL_CORE
T(C): 36.6 (03-03-21 @ 08:50), Max: 36.6 (03-03-21 @ 08:50)  HR: 85 (03-03-21 @ 08:53) (85 - 93)  BP: 111/68 (03-03-21 @ 08:53) (111/68 - 116/71)  RR: 18 (03-03-21 @ 08:50) (18 - 18)    EFM: 130 bpm, moderate variability, +accels  TOCO: irregular    Abd: soft, gravid, nontender, no incisional tenderness

## 2021-03-03 NOTE — OB RN INTRAOPERATIVE NOTE - NS_SPECIMENTYPE_OBGYN_ALL_OB
Placenta/Left Fallopian Tube/Right Fallopian Tube Placenta/Left Fallopian Tube/Right Fallopian Tube/Other

## 2021-03-03 NOTE — BRIEF OPERATIVE NOTE - NSICDXBRIEFPROCEDURE_GEN_ALL_CORE_FT
PROCEDURES:   section with bilateral salpingectomy 03-Mar-2021 12:22:35  Elvira Cox  Repeat  section 03-Mar-2021 12:21:21  Elvira Cox

## 2021-03-03 NOTE — PROGRESS NOTE ADULT - SUBJECTIVE AND OBJECTIVE BOX
SANDEE WU  40y  Female    PGY1 Note:    Pt is POD#0. Pt is recovering well, no acute complaints. Pt denies heavy vaginal bleeding, pain well controlled on PO medications. Patient is tolerating a clear liquid diet without nausea or vomiting. Denies heavy vaginal bleeding, pain well controlled on PO medications. Patient has not yet ambulated or passed gas. Patient is bottle feeding. Mayo in place with adequate clear urine. Denies headache, chest pain, SOB, fever, chills, nausea, vomiting, extremity pain or swelling.         PAST MEDICAL & SURGICAL HISTORY:  Anemia    History of  section  x 2        Physical Exam  Vital Signs Last 24 Hrs  T(C): 35.6 (03 Mar 2021 15:36), Max: 36.6 (03 Mar 2021 08:50)  T(F): 96 (03 Mar 2021 15:36), Max: 97.8 (03 Mar 2021 08:50)  HR: 78 (03 Mar 2021 15:36) (69 - 93)  BP: 91/55 (03 Mar 2021 15:36) (91/55 - 120/65)  RR: 18 (03 Mar 2021 15:36) (18 - 19)  SpO2: 100% (03 Mar 2021 14:15) (99% - 100%)    Gen: AAOx3, NAD  Heart: RRR, no M/R/G  Lungs: CTAB  Fundus: firm, below umbilicus, nontender  Wound: Pfannenstiel incision. initial dressing in place, clean and dry    Abd: Soft, appropriately tender near incision site, mildly distended, BS+  Lochia: moderate  Ext: No calf tenderness, no swelling    Labs:                        9.8    8.92  )-----------( 221      ( 03 Mar 2021 09:16 )             31.0      MEDICATIONS  (STANDING):  acetaminophen   Tablet .. 975 milliGRAM(s) Oral <User Schedule>  ibuprofen  Tablet. 600 milliGRAM(s) Oral every 6 hours  lactated ringers. 1000 milliLiter(s) (125 mL/Hr) IV Continuous <Continuous>  oxytocin Infusion 41.667 milliUNIT(s)/Min (125 mL/Hr) IV Continuous <Continuous>  oxytocin Infusion 333.333 milliUNIT(s)/Min (1000 mL/Hr) IV Continuous <Continuous>  senna 2 Tablet(s) Oral at bedtime    MEDICATIONS  (PRN):  diphenhydrAMINE 25 milliGRAM(s) Oral every 6 hours PRN Pruritus  lanolin Ointment 1 Application(s) Topical every 6 hours PRN Sore Nipples  magnesium hydroxide Suspension 30 milliLiter(s) Oral two times a day PRN Constipation  oxyCODONE    IR 5 milliGRAM(s) Oral every 3 hours PRN Moderate to Severe Pain (4-10)  oxyCODONE    IR 5 milliGRAM(s) Oral once PRN Moderate to Severe Pain (4-10)  simethicone 80 milliGRAM(s) Chew every 4 hours PRN Gas   SANDEE WU  40y  Female    PGY1 Note:    Pt is POD#0. Pt is recovering well, no acute complaints. Pt denies heavy vaginal bleeding, pain well controlled on PO medications. Patient is tolerating a clear liquid diet without nausea or vomiting. Denies heavy vaginal bleeding, pain well controlled on PO medications. Patient has not yet ambulated or passed gas. Patient is bottle feeding. Mayo in place with adequate clear urine. Denies headache, chest pain, SOB, fever, chills, nausea, vomiting, extremity pain or swelling.         PAST MEDICAL & SURGICAL HISTORY:  Anemia    History of  section  x 2        Physical Exam  Vital Signs Last 24 Hrs  T(C): 35.6 (03 Mar 2021 15:36), Max: 36.6 (03 Mar 2021 08:50)  T(F): 96 (03 Mar 2021 15:36), Max: 97.8 (03 Mar 2021 08:50)  HR: 78 (03 Mar 2021 15:36) (69 - 93)  BP: 91/55 (03 Mar 2021 15:36) (91/55 - 120/65)  RR: 18 (03 Mar 2021 15:36) (18 - 19)  SpO2: 100% (03 Mar 2021 14:15) (99% - 100%)    Gen: AAOx3, NAD  Heart: RRR, no M/R/G  Lungs: CTAB  Fundus: firm, below umbilicus, nontender  Wound: Pfannenstiel incision. initial dressing in place, clean and dry    Abd: Soft, appropriately tender near incision site, mildly distended, BS+  Lochia: moderate  Ext: No calf tenderness, no swelling    Labs:                        9.8    8.92  )-----------( 221      ( 03 Mar 2021 09:16 )             31.0     UOmin 37cc/hr: 6615-5256: 250cc     MEDICATIONS  (STANDING):  acetaminophen   Tablet .. 975 milliGRAM(s) Oral <User Schedule>  ibuprofen  Tablet. 600 milliGRAM(s) Oral every 6 hours  lactated ringers. 1000 milliLiter(s) (125 mL/Hr) IV Continuous <Continuous>  oxytocin Infusion 41.667 milliUNIT(s)/Min (125 mL/Hr) IV Continuous <Continuous>  oxytocin Infusion 333.333 milliUNIT(s)/Min (1000 mL/Hr) IV Continuous <Continuous>  senna 2 Tablet(s) Oral at bedtime    MEDICATIONS  (PRN):  diphenhydrAMINE 25 milliGRAM(s) Oral every 6 hours PRN Pruritus  lanolin Ointment 1 Application(s) Topical every 6 hours PRN Sore Nipples  magnesium hydroxide Suspension 30 milliLiter(s) Oral two times a day PRN Constipation  oxyCODONE    IR 5 milliGRAM(s) Oral every 3 hours PRN Moderate to Severe Pain (4-10)  oxyCODONE    IR 5 milliGRAM(s) Oral once PRN Moderate to Severe Pain (4-10)  simethicone 80 milliGRAM(s) Chew every 4 hours PRN Gas

## 2021-03-03 NOTE — OB RN PATIENT PROFILE - HBSAG: DATE, OB PROFILE
Pt was put on Dr Rodgers's schedule instead of being sent to ED. However, Dr Ochoa has agreed to see pt in clinic. Call made to pt, writer apologized for all of the confusion and advised him Dr Ochoa would see him at 0945 after all.   23-Jul-2020

## 2021-03-03 NOTE — BRIEF OPERATIVE NOTE - NSICDXBRIEFPREOP_GEN_ALL_CORE_FT
PRE-OP DIAGNOSIS:  Multiparity 03-Mar-2021 12:21:43  Elvira Cox  Previous  section 03-Mar-2021 12:21:35  Elvira Cox  GDM (gestational diabetes mellitus) 03-Mar-2021 12:22:01  Elvira Cox

## 2021-03-03 NOTE — BRIEF OPERATIVE NOTE - NSICDXBRIEFPOSTOP_GEN_ALL_CORE_FT
POST-OP DIAGNOSIS:  Multiparity 03-Mar-2021 12:22:11  Elvira Cox  GDM (gestational diabetes mellitus) 03-Mar-2021 12:22:20  Elvira Cox  Previous  section 03-Mar-2021 12:22:15  Elvira Cox

## 2021-03-03 NOTE — OB PROVIDER H&P - NSHPLABSRESULTS_GEN_ALL_CORE
GCT: 194    1/19/2021: 32.4 weeks: EFW 1766 gms (32%), cephalic, 3VC, ant placenta, MVP 44 mm, BPP 8/8 1/5/2021: 30.4 weeks: breech, 3VC, ant placenta, MVP 56 mm, BPP 8/8 12/29/2020: 29.4 weeks: EFW 1491 gms (56%), breech, 33VC, Ant placenta, MVP 39 mm, BPP 8/8  10/22/2020: 19.6 weeks: transverse, Ant placenta, no gross anatomy abnormalities  8/24/2020: 11.3 weeks: NT 0.8 mm

## 2021-03-04 LAB
BASOPHILS # BLD AUTO: 0.02 K/UL — SIGNIFICANT CHANGE UP (ref 0–0.2)
BASOPHILS # BLD AUTO: 0.02 K/UL — SIGNIFICANT CHANGE UP (ref 0–0.2)
BASOPHILS NFR BLD AUTO: 0.2 % — SIGNIFICANT CHANGE UP (ref 0–1)
BASOPHILS NFR BLD AUTO: 0.2 % — SIGNIFICANT CHANGE UP (ref 0–1)
EOSINOPHIL # BLD AUTO: 0.04 K/UL — SIGNIFICANT CHANGE UP (ref 0–0.7)
EOSINOPHIL # BLD AUTO: 0.07 K/UL — SIGNIFICANT CHANGE UP (ref 0–0.7)
EOSINOPHIL NFR BLD AUTO: 0.4 % — SIGNIFICANT CHANGE UP (ref 0–8)
EOSINOPHIL NFR BLD AUTO: 0.6 % — SIGNIFICANT CHANGE UP (ref 0–8)
HCT VFR BLD CALC: 22.9 % — LOW (ref 37–47)
HCT VFR BLD CALC: 25.2 % — LOW (ref 37–47)
HGB BLD-MCNC: 7.1 G/DL — LOW (ref 12–16)
HGB BLD-MCNC: 7.7 G/DL — LOW (ref 12–16)
HIV 1+2 AB+HIV1 P24 AG SERPL QL IA: SIGNIFICANT CHANGE UP
IMM GRANULOCYTES NFR BLD AUTO: 0.3 % — SIGNIFICANT CHANGE UP (ref 0.1–0.3)
IMM GRANULOCYTES NFR BLD AUTO: 0.5 % — HIGH (ref 0.1–0.3)
LYMPHOCYTES # BLD AUTO: 1.91 K/UL — SIGNIFICANT CHANGE UP (ref 1.2–3.4)
LYMPHOCYTES # BLD AUTO: 15.7 % — LOW (ref 20.5–51.1)
LYMPHOCYTES # BLD AUTO: 2.28 K/UL — SIGNIFICANT CHANGE UP (ref 1.2–3.4)
LYMPHOCYTES # BLD AUTO: 20.8 % — SIGNIFICANT CHANGE UP (ref 20.5–51.1)
MCHC RBC-ENTMCNC: 19.5 PG — LOW (ref 27–31)
MCHC RBC-ENTMCNC: 19.7 PG — LOW (ref 27–31)
MCHC RBC-ENTMCNC: 30.6 G/DL — LOW (ref 32–37)
MCHC RBC-ENTMCNC: 31 G/DL — LOW (ref 32–37)
MCV RBC AUTO: 63.6 FL — LOW (ref 81–99)
MCV RBC AUTO: 64 FL — LOW (ref 81–99)
MONOCYTES # BLD AUTO: 0.66 K/UL — HIGH (ref 0.1–0.6)
MONOCYTES # BLD AUTO: 0.7 K/UL — HIGH (ref 0.1–0.6)
MONOCYTES NFR BLD AUTO: 5.4 % — SIGNIFICANT CHANGE UP (ref 1.7–9.3)
MONOCYTES NFR BLD AUTO: 6.4 % — SIGNIFICANT CHANGE UP (ref 1.7–9.3)
NEUTROPHILS # BLD AUTO: 7.87 K/UL — HIGH (ref 1.4–6.5)
NEUTROPHILS # BLD AUTO: 9.49 K/UL — HIGH (ref 1.4–6.5)
NEUTROPHILS NFR BLD AUTO: 71.7 % — SIGNIFICANT CHANGE UP (ref 42.2–75.2)
NEUTROPHILS NFR BLD AUTO: 77.8 % — HIGH (ref 42.2–75.2)
NRBC # BLD: 0 /100 WBCS — SIGNIFICANT CHANGE UP (ref 0–0)
NRBC # BLD: 0 /100 WBCS — SIGNIFICANT CHANGE UP (ref 0–0)
PLATELET # BLD AUTO: 179 K/UL — SIGNIFICANT CHANGE UP (ref 130–400)
PLATELET # BLD AUTO: 220 K/UL — SIGNIFICANT CHANGE UP (ref 130–400)
RBC # BLD: 3.6 M/UL — LOW (ref 4.2–5.4)
RBC # BLD: 3.94 M/UL — LOW (ref 4.2–5.4)
RBC # FLD: 17.2 % — HIGH (ref 11.5–14.5)
RBC # FLD: 17.7 % — HIGH (ref 11.5–14.5)
SARS-COV-2 IGG SERPL QL IA: NEGATIVE — SIGNIFICANT CHANGE UP
SARS-COV-2 IGM SERPL IA-ACNC: 0.06 INDEX — SIGNIFICANT CHANGE UP
SURGICAL PATHOLOGY STUDY: SIGNIFICANT CHANGE UP
WBC # BLD: 10.96 K/UL — HIGH (ref 4.8–10.8)
WBC # BLD: 12.19 K/UL — HIGH (ref 4.8–10.8)
WBC # FLD AUTO: 10.96 K/UL — HIGH (ref 4.8–10.8)
WBC # FLD AUTO: 12.19 K/UL — HIGH (ref 4.8–10.8)

## 2021-03-04 RX ADMIN — Medication 600 MILLIGRAM(S): at 18:34

## 2021-03-04 RX ADMIN — Medication 600 MILLIGRAM(S): at 12:28

## 2021-03-04 RX ADMIN — Medication 975 MILLIGRAM(S): at 15:06

## 2021-03-04 RX ADMIN — SENNA PLUS 2 TABLET(S): 8.6 TABLET ORAL at 21:19

## 2021-03-04 RX ADMIN — Medication 975 MILLIGRAM(S): at 09:00

## 2021-03-04 RX ADMIN — Medication 600 MILLIGRAM(S): at 11:34

## 2021-03-04 RX ADMIN — Medication 600 MILLIGRAM(S): at 06:29

## 2021-03-04 RX ADMIN — MAGNESIUM HYDROXIDE 30 MILLILITER(S): 400 TABLET, CHEWABLE ORAL at 18:34

## 2021-03-04 RX ADMIN — Medication 975 MILLIGRAM(S): at 14:26

## 2021-03-04 RX ADMIN — SIMETHICONE 80 MILLIGRAM(S): 80 TABLET, CHEWABLE ORAL at 16:17

## 2021-03-04 RX ADMIN — Medication 975 MILLIGRAM(S): at 08:14

## 2021-03-04 RX ADMIN — Medication 600 MILLIGRAM(S): at 19:56

## 2021-03-04 RX ADMIN — ENOXAPARIN SODIUM 40 MILLIGRAM(S): 100 INJECTION SUBCUTANEOUS at 11:35

## 2021-03-04 NOTE — PROGRESS NOTE ADULT - SUBJECTIVE AND OBJECTIVE BOX
Status post rcs and bilateral salpingectomy day # 1  Afebarle and vs stable  Ambulatory; voiding well since pereyra out; using incentive spirometrer. tolerating full liquid diet ; not passing rectal flatus yet.  PE;abdomen softly distended and moderately tympanic       incision  healing well; no evidence of induration       lochia light       uterine fundus firm, well below umbilicus       no calf tenderness    hg , as per note from this am down to 7.1 from 9gm pre op ( pt had been receiving fe infusions prior to admission)    imp; post op rcs doing well    plan; encourage  ambulation          encourage p o fluids          continue clear liquid diet.          if not passing flatus or if abdomen remains tympanic, fleets enema tonight or first thing in am          possilbe discharge tomorrow if passing flatus and if able to tolerate regular diet

## 2021-03-04 NOTE — PROGRESS NOTE ADULT - SUBJECTIVE AND OBJECTIVE BOX
Post  section POD1     Pt doing well, pain well controlled. No overnight events, no acute complaints. She is OOB to chair, tolerating clear liquid diet. Has not passed flatus. Denies N/V, chest pain, SOB, fevers, chills, extremity pain or swelling    ROS: Denies cardiovascular or respiratory symptoms        Physical Exam  Vital Signs Last 24 Hrs  T(F): 97.2 (04 Mar 2021 03:40), Max: 97.9 (03 Mar 2021 23:27)  HR: 80 (04 Mar 2021 03:40) (69 - 97)  BP: 87/51 (04 Mar 2021 03:40) (87/51 - 120/65)  RR: 18 (04 Mar 2021 03:40) (18 - 19)    Physical exam:  General - AAOx3, NAD  Heart - S1S2, RRR  Lungs - CTA BL  Abdomen:  - Soft, nontender, nondistended, BS+  - Fundus firm, nontender, below the umbilicus  - Incision clean/dry/intact (surgical dressing changed)  Pelvis/Vagina - moderate lochia  Extremities: No calf tenderness, no swelling    Labs:                        7.1    10.96 )-----------( 179      ( 04 Mar 2021 04:45 )             22.9                         9.8    8.92  )-----------( 221      ( 03 Mar 2021 09:16 )             31.0     Antibody Screen: NEG (21 @ 09:15)    Prenatal Syphilis Test: Nonreact (21 @ 09:16)

## 2021-03-05 ENCOUNTER — TRANSCRIPTION ENCOUNTER (OUTPATIENT)
Age: 41
End: 2021-03-05

## 2021-03-05 ENCOUNTER — EMERGENCY (EMERGENCY)
Facility: HOSPITAL | Age: 41
LOS: 0 days | Discharge: HOME | End: 2021-03-05
Attending: EMERGENCY MEDICINE | Admitting: OBSTETRICS & GYNECOLOGY
Payer: COMMERCIAL

## 2021-03-05 VITALS
HEIGHT: 62 IN | TEMPERATURE: 99 F | HEART RATE: 107 BPM | RESPIRATION RATE: 18 BRPM | DIASTOLIC BLOOD PRESSURE: 73 MMHG | OXYGEN SATURATION: 99 % | SYSTOLIC BLOOD PRESSURE: 129 MMHG

## 2021-03-05 VITALS
DIASTOLIC BLOOD PRESSURE: 70 MMHG | HEART RATE: 84 BPM | RESPIRATION RATE: 18 BRPM | TEMPERATURE: 98 F | SYSTOLIC BLOOD PRESSURE: 114 MMHG

## 2021-03-05 DIAGNOSIS — Z98.891 HISTORY OF UTERINE SCAR FROM PREVIOUS SURGERY: Chronic | ICD-10-CM

## 2021-03-05 DIAGNOSIS — Z02.9 ENCOUNTER FOR ADMINISTRATIVE EXAMINATIONS, UNSPECIFIED: ICD-10-CM

## 2021-03-05 PROCEDURE — 99285 EMERGENCY DEPT VISIT HI MDM: CPT

## 2021-03-05 PROCEDURE — 93970 EXTREMITY STUDY: CPT | Mod: 26

## 2021-03-05 RX ORDER — LANOLIN
1 OINTMENT (GRAM) TOPICAL
Qty: 0 | Refills: 0 | DISCHARGE
Start: 2021-03-05

## 2021-03-05 RX ORDER — ACETAMINOPHEN 500 MG
3 TABLET ORAL
Qty: 0 | Refills: 0 | DISCHARGE
Start: 2021-03-05

## 2021-03-05 RX ORDER — IBUPROFEN 200 MG
1 TABLET ORAL
Qty: 0 | Refills: 0 | DISCHARGE
Start: 2021-03-05

## 2021-03-05 RX ADMIN — Medication 975 MILLIGRAM(S): at 08:46

## 2021-03-05 RX ADMIN — ENOXAPARIN SODIUM 40 MILLIGRAM(S): 100 INJECTION SUBCUTANEOUS at 11:50

## 2021-03-05 RX ADMIN — Medication 600 MILLIGRAM(S): at 06:13

## 2021-03-05 RX ADMIN — Medication 600 MILLIGRAM(S): at 11:45

## 2021-03-05 RX ADMIN — SIMETHICONE 80 MILLIGRAM(S): 80 TABLET, CHEWABLE ORAL at 08:49

## 2021-03-05 NOTE — DISCHARGE NOTE OB - CARE PLAN
Principal Discharge DX:	Pregnancy  Goal:	healthy mom and baby  Assessment and plan of treatment:	full term intrauterine pregnancy followed by repeat  section  Secondary Diagnosis:	Born by  section

## 2021-03-05 NOTE — ED PROVIDER NOTE - PATIENT PORTAL LINK FT
You can access the FollowMyHealth Patient Portal offered by  by registering at the following website: [email or web address]. By joining [employer]’s FollowMyHealth portal, you will also be able to view your health information using other applications (apps) compatible with our system.

## 2021-03-05 NOTE — DISCHARGE NOTE OB - MEDICATION SUMMARY - MEDICATIONS TO TAKE
I will START or STAY ON the medications listed below when I get home from the hospital:    acetaminophen 325 mg oral tablet  -- 3 tab(s) by mouth   -- Indication: For prn pain    ibuprofen 600 mg oral tablet  -- 1 tab(s) by mouth every 6 hours  -- Indication: For prn pain    lanolin topical ointment  -- 1 application on skin every 6 hours, As needed, Sore Nipples  -- Indication: For Cracked nipples

## 2021-03-05 NOTE — ED PROVIDER NOTE - OBJECTIVE STATEMENT
The patient is a 40 year old female w/recent  (discharged today) presenting for left leg swelling x a few hours. Pt states when she got home from the hospital she noticed her left leg looked more swollen than the right. Pt called her OB and was told to go to the ED for lower extremity ultrasound to r/o DVT. Pt denies leg pain. No history of blood clots.

## 2021-03-05 NOTE — ED PROVIDER NOTE - CARE PROVIDERS DIRECT ADDRESSES
[email or web address].[email or web address] [facility], [facility]

## 2021-03-05 NOTE — PROGRESS NOTE ADULT - SUBJECTIVE AND OBJECTIVE BOX
Post  section POD2    HPI: Pt doing well, pain well controlled. No overnight events, no acute complaints. She is ambulating, voiding, tolerating full liquid diet, has had a bowel movement and is passing flatus. Denies N/V, fevers ,chills, chest pain, SOB, extremity pain or swelling    ROS: Denies cardiovascular or respiratory symptoms        Physical Exam  Vital Signs Last 24 Hrs  T(F): 97.2 (04 Mar 2021 20:40), Max: 98.4 (04 Mar 2021 07:46)  HR: 90 (04 Mar 2021 20:40) (85 - 92)  BP: 105/62 (04 Mar 2021 20:40) (89/55 - 106/61)  RR: 18 (04 Mar 2021 20:40) (18 - 18)    Physical exam:  General - AAOx3, NAD  Heart - S1S2, RRR  Lungs - CTA BL  Abdomen:  - Soft, nontender, nondistended, BS+  - Fundus firm, nontender, below the umbilicus  -Incision: clean/dry/intact. Steri strips in place  Pelvis/Vagina - minimal rubra  Extremities: No calf tenderness, no swelling    Labs:                        7.7    12.19 )-----------( 220      ( 04 Mar 2021 20:14 )             25.2                         7.1    10.96 )-----------( 179      ( 04 Mar 2021 04:45 )             22.9     Antibody Screen: NEG (21 @ 09:15)

## 2021-03-05 NOTE — PROGRESS NOTE ADULT - SUBJECTIVE AND OBJECTIVE BOX
Status post repeat  section and tubal sterilization day # 2  Afebrile and vs stable  Ambulatory, voiding without complaints, passing flatus and had bowel movement  Tolerating diet. using incentive spirometer  not nursing, no engorgement  pe: abdomen soft; minimal tympany; incision healing well       lochia minimal       no calf tenderness    imp; status post repeat  section doing well  plan; regular diet; discharge home; all instructions given; ibuprofen for pain          return to office 2 wks or prn

## 2021-03-05 NOTE — DISCHARGE NOTE OB - CARE PROVIDER_API CALL
Dustin Lopez)  Obstetrics and Gynecology  [address] [place] [address]  Phone: [phone]  Fax: [phone]  Follow Up Time:

## 2021-03-05 NOTE — PROGRESS NOTE ADULT - ASSESSMENT
A/P: 41yo P3 with h/o GDMA1 s/p repeat c/s POD#0 , recovering well   - encourage ambulation, PO hydration  - monitor vitals, bleeding   - simethicone/colace   - lovenox in the AM, SCDs for DVT prophylaxis  - encourage use of inscentive spirometry  - continue clear liquid diet  - pain mgmt prn   - f/u AM CBC       Dr. Whalen and Dr. Lopez to be made aware
40y P3 s/p repeat  section POD1, with anemia and starting Hb of 9.8, currently stable    -Repeat CBC in   -TOV 1230  -Pain management  -IV hydration  -Advance to full diet  -Lovenox  -PO hydration and ambulation encouraged  -Incentive spirometry      Dr. Lopez to be made aware
40y P3 s/p repeat  section POD2, with anemia and starting Hb of 9.8, currently stable      -Pain management  -IV hydration  -Advance to regular diet  -Lovenox  -PO hydration and ambulation encouraged  -Incentive spirometry        Dr. Lopez aware

## 2021-03-05 NOTE — ED PROVIDER NOTE - ATTENDING CONTRIBUTION TO CARE
left greater than right leg swelling that occurred after going home from delivery of c s. denies ches tpain or sob. exam shows bilateral swellin gbut left greater than right, no pain, no ertyhema. plan is to obtain dvt us.

## 2021-03-05 NOTE — DISCHARGE NOTE OB - HOSPITAL COURSE
DATE OF DISCHARGE: 21 @ 08:05    HISTORY OF PRESENT ILLNESS/HOSPITAL COURSE: HPI:  40y  @ 38.5 weeks by LMP, EDC 3/12/2021, AMA, h/o GDM and anemia, present for scheduled repeat C/S and bilateral salpingectomy. Patient has h/o prior C/S x 1. GDM is controlled with insulin at bedtime. Fasting ranges from . 2 hrs post prandial ranges from . FS this morning was 76. Last dose of insulin was taken last night. S/p iron infusion x 7, last insfusion was 2021. Denies VB, LOF, and ctx. +FM. No complaints at this time.   (03 Mar 2021 09:07)    PAST MEDICAL & SURGICAL HISTORY:  Anemia    History of  section  x 2        PROCEDURES PERFORMED:  section    COMPLICATIONS:      POST PARTUM COURSE:       FINAL DIAGNOSIS:      LABS:                       7.7    12.19 )-----------( 220      ( 04 Mar 2021 20:14 )             25.2       DISCHARGE INSTRUCTIONS:      If you have severe abdominal pain, heavy vaginal bleeding, shortness of breath or chest pain please call your doctor or come to the emergency room.   DIET:  Advance as tolerated.  ACTIVITY:  Advance as tolerated.  Pelvic rest for 6 weeks.  Nothing to be inserted into the vagina for 6 weeks, i.e. no tampons, douching, sexual relations, or tub baths.   FOLLOW UP: Make an appointment to see your doctor in 2 week

## 2021-03-05 NOTE — ED PROVIDER NOTE - PHYSICAL EXAMINATION
CONSTITUTIONAL: Well-developed; well-nourished; in no acute distress.   SKIN: warm, dry  HEAD: Normocephalic; atraumatic.  EYES: normal sclera and conjunctiva   ENT: No nasal discharge; airway clear.  EXT: L>R leg swelling. no calf tenderness. no erythema. dorsalis pedal pulses 2+ b/l.   LYMPH: No acute cervical adenopathy.  NEURO: Alert, oriented, grossly unremarkable  PSYCH: Cooperative, appropriate.

## 2021-03-05 NOTE — DISCHARGE NOTE OB - PATIENT PORTAL LINK FT
You can access the FollowMyHealth Patient Portal offered by [facility] by registering at the following website: [email or web address]. By joining [employer]’s FollowMyHealth portal, you will also be able to view your health information using other applications (apps) compatible with our system.

## 2021-03-05 NOTE — ED PROVIDER NOTE - NS ED ROS FT
Eyes:  No visual changes, eye pain or discharge.  ENMT:  No hearing changes, pain, discharge or infections. No neck pain or stiffness.  Cardiac:  No chest pain, SOB or edema. No chest pain with exertion.  Respiratory:  No cough or respiratory distress. No hemoptysis. No history of asthma or RAD.  GI:  No nausea, vomiting, diarrhea or abdominal pain.  :  No dysuria, frequency or burning.  MS:  +L>R leg swelling. no leg pain.   Neuro:  No headache or weakness.  No LOC.  Skin:  No skin rash.   Endocrine: No history of thyroid disease or diabetes.

## 2021-03-05 NOTE — ED PROVIDER NOTE - CARE PROVIDER_API CALL
Dustin Lopez)  Obstetrics and Gynecology  [address] [place] [address]  Phone: [phone]  Fax: [phone]  Established Patient  Follow Up Time: Urgent

## 2021-03-06 PROBLEM — D64.9 ANEMIA, UNSPECIFIED: Chronic | Status: ACTIVE | Noted: 2021-03-03

## 2021-03-15 ENCOUNTER — APPOINTMENT (OUTPATIENT)
Dept: OBGYN | Facility: CLINIC | Age: 41
End: 2021-03-15
Payer: COMMERCIAL

## 2021-03-15 VITALS
BODY MASS INDEX: 28.53 KG/M2 | WEIGHT: 156 LBS | DIASTOLIC BLOOD PRESSURE: 70 MMHG | SYSTOLIC BLOOD PRESSURE: 120 MMHG | TEMPERATURE: 97.8 F

## 2021-03-15 DIAGNOSIS — N91.1 SECONDARY AMENORRHEA: ICD-10-CM

## 2021-03-15 DIAGNOSIS — Z23 ENCOUNTER FOR IMMUNIZATION: ICD-10-CM

## 2021-03-15 DIAGNOSIS — Z98.891 HISTORY OF UTERINE SCAR FROM PREVIOUS SURGERY: ICD-10-CM

## 2021-03-15 DIAGNOSIS — Z87.42 PERSONAL HISTORY OF OTHER DISEASES OF THE FEMALE GENITAL TRACT: ICD-10-CM

## 2021-03-15 PROCEDURE — 0503F POSTPARTUM CARE VISIT: CPT

## 2021-04-19 ENCOUNTER — APPOINTMENT (OUTPATIENT)
Dept: OBGYN | Facility: CLINIC | Age: 41
End: 2021-04-19
Payer: COMMERCIAL

## 2021-04-19 VITALS — TEMPERATURE: 97.8 F | HEIGHT: 62 IN | BODY MASS INDEX: 29.08 KG/M2 | WEIGHT: 158 LBS

## 2021-04-19 DIAGNOSIS — O24.419 GESTATIONAL DIABETES MELLITUS IN PREGNANCY, UNSPECIFIED CONTROL: ICD-10-CM

## 2021-04-19 DIAGNOSIS — Z34.90 ENCOUNTER FOR SUPERVISION OF NORMAL PREGNANCY, UNSPECIFIED, UNSPECIFIED TRIMESTER: ICD-10-CM

## 2021-04-19 PROCEDURE — 0503F POSTPARTUM CARE VISIT: CPT

## 2021-08-19 ENCOUNTER — APPOINTMENT (OUTPATIENT)
Dept: OBGYN | Facility: CLINIC | Age: 41
End: 2021-08-19

## 2022-07-17 ENCOUNTER — LABORATORY RESULT (OUTPATIENT)
Age: 42
End: 2022-07-17

## 2022-07-18 ENCOUNTER — APPOINTMENT (OUTPATIENT)
Dept: OBGYN | Facility: CLINIC | Age: 42
End: 2022-07-18

## 2022-07-18 VITALS — WEIGHT: 154 LBS | TEMPERATURE: 98.7 F | HEIGHT: 62 IN | BODY MASS INDEX: 28.34 KG/M2

## 2022-07-18 PROCEDURE — 99395 PREV VISIT EST AGE 18-39: CPT

## 2023-08-13 ENCOUNTER — LABORATORY RESULT (OUTPATIENT)
Age: 43
End: 2023-08-13

## 2023-08-14 ENCOUNTER — APPOINTMENT (OUTPATIENT)
Dept: OBGYN | Facility: CLINIC | Age: 43
End: 2023-08-14
Payer: COMMERCIAL

## 2023-08-14 VITALS — BODY MASS INDEX: 28.71 KG/M2 | HEIGHT: 62 IN | TEMPERATURE: 97.3 F | WEIGHT: 156 LBS

## 2023-08-14 DIAGNOSIS — Z01.419 ENCOUNTER FOR GYNECOLOGICAL EXAMINATION (GENERAL) (ROUTINE) W/OUT ABNORMAL FINDINGS: ICD-10-CM

## 2023-08-14 PROCEDURE — 99396 PREV VISIT EST AGE 40-64: CPT

## 2023-08-14 NOTE — DISCUSSION/SUMMARY
[FreeTextEntry1] : 42 YEAR OLD FEMALE LMP 7/22/2023 WITH CYCLES MONTHLY Q 28 X 5-7 ( STOPS AT 4-5 AND THEN STARTS AGAIN FOR 2 DAYS)  HEAVY FOR 1ST TWO DAYS, PRESENTS FOR WELL WOMAN GYNECOLOGIC EXAMINATION AND PAP. LAST SEEN FOR WELL WOMAN VISIT 7/18/2022; PAP AND HPV HR TESTING DONE AT THAT TIME WERE NEGATIVE. NO NEW MEDICAL OR SURGICAL HISTORY SINCE LAST VISIT. MEDICATIONS; NONE  MEDICATION ALLERGIES; NONE ROS;BMS-NORMAL;NO FAM HISTORY OF COLON CANCER          NO URINARY COMPLAINTS          SEXUALY A;CTIVE WITHJOUT COMPLAINTS- HAD BILATERAL SALPINGECTOMY BREASTS; DOES BRAEST SELF EXAMINATION                    LAST MAMMOGRAPHY DONE 3/8/2023 BIRADS I                    NO FAM HISTORY OF BREAST CANCER -EXERCISE; - MULTIVITAMNS; + DAIRY;-TOBACCO OR VAPING  PE;.66; TEMP 97.3; ; WEIGHT 156 LBS; HEIGHT 5'2"      BREASTS; NO MASSES OR DISCHARGES      ABDOMEN;SOFT, NO MASSES; NO TENDERNESS TO PALPATION;; LOW TRANSVERSE SCAR      PELVIC; NORMAL EXTERNAL GENITAILA                    NORMAL VAGINA WITHOUT LESION                    NORMAL CERVIX WITHOUT LESION                    ANTEVERTED NORMAL UTERUS                    NO PALPABLE ADENXAL MASSES  IMP;WELL WOMAN  VISIT  PLAN; THIN PREP PAP WITH HR HPV TESTING DONE-PT ADVISED TO CALL IN 2 WKS-RESULTS             BREAST SELF EXAMINATION MONTHLY CONTINUED TO BE STRONGLY ADVISED             EXERCISE, VITAIMNS, DAIRY ENCOURAGED             SCREENING MAMMOGRAPHY YEARLY ADVISED AND REFERRAL SCIRPT GIVEN FOR 3/23             RETURN TO OFFICE ONE YEAR  OR PRN

## 2023-10-04 NOTE — PRE-ANESTHESIA EVALUATION ADULT - RESPIRATORY RATE (BREATHS/MIN)
18
Latisse Counseling: I reviewed the possible side-effects of Latisse including itching, eye irritation, discoloration and exacerbating glaucoma.  I also discussed application methods.
Detail Level: Zone

## 2024-04-15 ENCOUNTER — APPOINTMENT (OUTPATIENT)
Dept: OBGYN | Facility: CLINIC | Age: 44
End: 2024-04-15
Payer: COMMERCIAL

## 2024-04-15 VITALS — WEIGHT: 142 LBS | HEIGHT: 62 IN | TEMPERATURE: 96.2 F | BODY MASS INDEX: 26.13 KG/M2

## 2024-04-15 PROCEDURE — 99212 OFFICE O/P EST SF 10 MIN: CPT

## 2024-04-15 NOTE — DISCUSSION/SUMMARY
[FreeTextEntry1] : 43 YEAR OLD FEMLE LMP 3/26/2024 PRESENTS FOR EVUATION OF SWELLING AND LUMP THAT SHE FIRST NOTICED ON OUTER LOWER PERINEUM APPROXIMATELY 6 DAYS AGO.AT FIRST IT WAS BIGGER AND PAINFUL BUT AFTER SPEAKING WITH PT LAST WEEK AND HAVING USED WARM SOAKS, AREA SEEMS TO HAVE GOTTEN SMALLER AND IS NO LONGER PAINFUL. NO OTHER S/S ASSOCIATED WITH THIS SWELLING. PT GOING ON VACATION 4/18/2024 AND WANTED IT CHECKED.  PE;/62; TEMP 96.2;WEIGHT 142 LBS HEIGHT 5'2"        PELVIC; EXAMINATION OF VULAR AREA WITH SOMEWHAT ELIPTICAL SHAPED < 1 CM AREA                   NON TENDER AND JUST BELOW VAIGNAL OPENING 2 CM AND TO PTS RT. NO OBVIOUS                   XTERNAL OPENING.   IMP; ? FOLLICULITIS RESOLVING.  PLAN; CONTINUE WARM SOAKS AND IF NOT IMPROVED OR WORSENED, PT TO CALL 4/17/2024 AND WILL PRESCRIBE ORAL ANTIBIOTIC.

## 2024-09-05 ENCOUNTER — APPOINTMENT (OUTPATIENT)
Dept: OBGYN | Facility: CLINIC | Age: 44
End: 2024-09-05

## 2025-04-22 ENCOUNTER — APPOINTMENT (OUTPATIENT)
Dept: OBGYN | Facility: CLINIC | Age: 45
End: 2025-04-22
Payer: COMMERCIAL

## 2025-04-22 ENCOUNTER — NON-APPOINTMENT (OUTPATIENT)
Age: 45
End: 2025-04-22

## 2025-04-22 ENCOUNTER — LABORATORY RESULT (OUTPATIENT)
Age: 45
End: 2025-04-22

## 2025-04-22 VITALS — TEMPERATURE: 97.1 F | WEIGHT: 150 LBS | BODY MASS INDEX: 27.6 KG/M2 | HEIGHT: 62 IN

## 2025-04-22 DIAGNOSIS — Z01.419 ENCOUNTER FOR GYNECOLOGICAL EXAMINATION (GENERAL) (ROUTINE) W/OUT ABNORMAL FINDINGS: ICD-10-CM

## 2025-04-22 DIAGNOSIS — Z86.2 PERSONAL HISTORY OF DISEASES OF THE BLOOD AND BLOOD-FORMING ORGANS AND CERTAIN DISORDERS INVOLVING THE IMMUNE MECHANISM: ICD-10-CM

## 2025-04-22 LAB
APPEARANCE: CLEAR
BILIRUBIN URINE: NEGATIVE
BLOOD URINE: ABNORMAL
COLOR: YELLOW
GLUCOSE QUALITATIVE U: NEGATIVE
KETONES URINE: NEGATIVE
LEUKOCYTE ESTERASE URINE: ABNORMAL
NITRITE URINE: NEGATIVE
PH URINE: 6
PROTEIN URINE: NEGATIVE
SPECIFIC GRAVITY URINE: 1.02
UROBILINOGEN URINE: 0.2 (ref 0.2–?)

## 2025-04-22 PROCEDURE — 99396 PREV VISIT EST AGE 40-64: CPT
